# Patient Record
Sex: FEMALE | URBAN - METROPOLITAN AREA
[De-identification: names, ages, dates, MRNs, and addresses within clinical notes are randomized per-mention and may not be internally consistent; named-entity substitution may affect disease eponyms.]

---

## 2018-01-05 NOTE — PROGRESS NOTES
"  SUBJECTIVE:                                                    Ab Christianson is a 22 year old female who presents to clinic today for the following health issues:      HPI    Foot Pain    Onset: 1 week    Description:   Location: Left foot  Character: Patient states she feels like she has a ball in her foot.    Intensity: 8/10 at its worst    Progression of Symptoms: same, feels pain while walking.    Accompanying Signs & Symptoms:  Other symptoms: swelling    History:   Previous similar pain: no       Precipitating factors:   Trauma or overuse: Unsure     Alleviating factors:  Improved by: rest/inactivity, massage and Ibuprofen    Pain can move up into calf area.   No swelling or redness  No history of clots and no family history of bleeding disorders or clots  Does not smoke or have heart disease.     Tenderness along the plantar area.       Would like referral for gynecology to get Nexplanon    Heart Palpitations:   No caffeine  Catching her breath when this happens  Does not always happen  Not sure if it is anxiety related        Problem list and histories reviewed & adjusted, as indicated.  Additional history: as documented        No current outpatient prescriptions on file.     BP Readings from Last 3 Encounters:   01/08/18 110/58   10/01/15 94/62   09/21/15 100/60    Wt Readings from Last 3 Encounters:   01/08/18 146 lb (66.2 kg)   10/01/15 149 lb (67.6 kg) (82 %)*   09/21/15 151 lb 8 oz (68.7 kg) (84 %)*     * Growth percentiles are based on CDC 2-20 Years data.              ROS:  Intermittent shortness of breath with heart palpitations. Was on Propanolol years ago for migraines. No longer taking any medications.   Occasionally will take OTC meds for headaches. Nothing with caffeine.       OBJECTIVE:     /58  Pulse 76  Temp 97.6  F (36.4  C) (Temporal)  Resp 14  Ht 5' 2\" (1.575 m)  Wt 146 lb (66.2 kg)  SpO2 99%  Breastfeeding? No  BMI 26.7 kg/m2  Body mass index is 26.7 " kg/(m^2).  Physical Exam   Constitutional: She is oriented to person, place, and time. Vital signs are normal.   Eyes: Pupils are equal, round, and reactive to light.   Cardiovascular: Normal rate, regular rhythm and normal heart sounds.    Pulses:       Radial pulses are 2+ on the right side, and 2+ on the left side.   Pulmonary/Chest: Effort normal and breath sounds normal.   Musculoskeletal:        Left ankle: She exhibits normal range of motion, no swelling, no ecchymosis and normal pulse.        Left foot: There is tenderness. There is normal range of motion, no bony tenderness, no swelling and normal capillary refill.        Feet:    Tenderness along along the left foot, specifically the plantar surface near the arch.    Neurological: She is alert and oriented to person, place, and time.         Diagnostic Test Results:  EKG NS bradycardia HR 59 otherwise normal.     Results for orders placed or performed in visit on 01/08/18   CBC with platelets   Result Value Ref Range    WBC 8.2 4.0 - 11.0 10e9/L    RBC Count 4.71 3.8 - 5.2 10e12/L    Hemoglobin 13.9 11.7 - 15.7 g/dL    Hematocrit 42.1 35.0 - 47.0 %    MCV 89 78 - 100 fl    MCH 29.5 26.5 - 33.0 pg    MCHC 33.0 31.5 - 36.5 g/dL    RDW 12.3 10.0 - 15.0 %    Platelet Count 236 150 - 450 10e9/L   Comprehensive metabolic panel   Result Value Ref Range    Sodium 138 133 - 144 mmol/L    Potassium 3.8 3.4 - 5.3 mmol/L    Chloride 104 94 - 109 mmol/L    Carbon Dioxide 27 20 - 32 mmol/L    Anion Gap 7 3 - 14 mmol/L    Glucose 87 70 - 99 mg/dL    Urea Nitrogen 12 7 - 30 mg/dL    Creatinine 0.65 0.52 - 1.04 mg/dL    GFR Estimate >90 >60 mL/min/1.7m2    GFR Estimate If Black >90 >60 mL/min/1.7m2    Calcium 9.1 8.5 - 10.1 mg/dL    Bilirubin Total 0.5 0.2 - 1.3 mg/dL    Albumin 4.2 3.4 - 5.0 g/dL    Protein Total 7.3 6.8 - 8.8 g/dL    Alkaline Phosphatase 80 40 - 150 U/L    ALT 32 0 - 50 U/L    AST 9 0 - 45 U/L   TSH with free T4 reflex   Result Value Ref Range    TSH  1.82 0.40 - 4.00 mU/L   Neisseria gonorrhoeae PCR   Result Value Ref Range    Specimen Descrip Urine     N Gonorrhea PCR Negative NEG^Negative   Chlamydia trachomatis PCR   Result Value Ref Range    Specimen Description Urine     Chlamydia Trachomatis PCR Negative NEG^Negative       ASSESSMENT/PLAN:     1. Left foot pain  - Likely related to shoes while working and probably some baseline plantar fascitis   - Given pain within the calf area and patients concern for clot completed lower extremity ultrasound which was negative for DVT. This was low clinical suspicion as there was not calf tenderness, swelling, redness and negative Homans sign.   - Discussed plantar fascitis along with conservative treatment plans-  - Follow up with podiatry   - XR Foot Left G/E 3 Views; Future  - US Lower Extremity Venous Duplex Left; Future    2. Heart palpitations  - EKG normal  - Labs normal  - Recommend follow up visit and Holter monitor in the future. Patient can't follow up for 2 weeks due to travel and will make a physical appointment.   - EKG 12-lead complete w/read - Clinics  - CBC with platelets  - Comprehensive metabolic panel  - TSH with free T4 reflex    3. Encounter for female birth control  - Referral and STD testing today.   - Patient would like Nexplanon.   - OB/GYN REFERRAL  - Neisseria gonorrhoeae PCR    4. Screening examination for venereal disease    - Neisseria gonorrhoeae PCR  - Chlamydia trachomatis PCR    The patient indicates understanding of these issues and agrees with the plan.    Patient Instructions   - STD testing today at lab along with additional labs   - Follow up with gynecology to discuss Nexplanon implant.   - Xray of left foot and exercises as listed below. Recommend TYLENOL 1000 mg every 6 hours; maximum daily dose: 3000 mg daily alternating with ibuprofen 400 every 6 hours. Follow up with Podiatry as referred.   - Ultrasound of left calf today to r/o DVT  - Follow up with me in 2 weeks to  discuss heart palpitations and physical. EKG today.   -  If you develop shortness of breath, chest pain, worsening symptoms of your heart palpitations.     KRISTY Snow CNP, APRN CNP  [facility]

## 2018-01-08 ENCOUNTER — RADIANT APPOINTMENT (OUTPATIENT)
Dept: ULTRASOUND IMAGING | Facility: OTHER | Age: 23
End: 2018-01-08
Attending: NURSE PRACTITIONER
Payer: COMMERCIAL

## 2018-01-08 ENCOUNTER — TELEPHONE (OUTPATIENT)
Dept: FAMILY MEDICINE | Facility: OTHER | Age: 23
End: 2018-01-08

## 2018-01-08 ENCOUNTER — OFFICE VISIT (OUTPATIENT)
Dept: FAMILY MEDICINE | Facility: OTHER | Age: 23
End: 2018-01-08
Payer: COMMERCIAL

## 2018-01-08 ENCOUNTER — RADIANT APPOINTMENT (OUTPATIENT)
Dept: GENERAL RADIOLOGY | Facility: OTHER | Age: 23
End: 2018-01-08
Attending: NURSE PRACTITIONER
Payer: COMMERCIAL

## 2018-01-08 VITALS
TEMPERATURE: 97.6 F | HEART RATE: 76 BPM | RESPIRATION RATE: 14 BRPM | SYSTOLIC BLOOD PRESSURE: 110 MMHG | DIASTOLIC BLOOD PRESSURE: 58 MMHG | HEIGHT: 62 IN | WEIGHT: 146 LBS | BODY MASS INDEX: 26.87 KG/M2 | OXYGEN SATURATION: 99 %

## 2018-01-08 DIAGNOSIS — Z11.3 SCREENING EXAMINATION FOR VENEREAL DISEASE: ICD-10-CM

## 2018-01-08 DIAGNOSIS — R00.2 HEART PALPITATIONS: ICD-10-CM

## 2018-01-08 DIAGNOSIS — M79.672 LEFT FOOT PAIN: ICD-10-CM

## 2018-01-08 DIAGNOSIS — Z30.019 ENCOUNTER FOR FEMALE BIRTH CONTROL: ICD-10-CM

## 2018-01-08 DIAGNOSIS — M79.672 LEFT FOOT PAIN: Primary | ICD-10-CM

## 2018-01-08 LAB
ALBUMIN SERPL-MCNC: 4.2 G/DL (ref 3.4–5)
ALP SERPL-CCNC: 80 U/L (ref 40–150)
ALT SERPL W P-5'-P-CCNC: 32 U/L (ref 0–50)
ANION GAP SERPL CALCULATED.3IONS-SCNC: 7 MMOL/L (ref 3–14)
AST SERPL W P-5'-P-CCNC: 9 U/L (ref 0–45)
BILIRUB SERPL-MCNC: 0.5 MG/DL (ref 0.2–1.3)
BUN SERPL-MCNC: 12 MG/DL (ref 7–30)
CALCIUM SERPL-MCNC: 9.1 MG/DL (ref 8.5–10.1)
CHLORIDE SERPL-SCNC: 104 MMOL/L (ref 94–109)
CO2 SERPL-SCNC: 27 MMOL/L (ref 20–32)
CREAT SERPL-MCNC: 0.65 MG/DL (ref 0.52–1.04)
ERYTHROCYTE [DISTWIDTH] IN BLOOD BY AUTOMATED COUNT: 12.3 % (ref 10–15)
GFR SERPL CREATININE-BSD FRML MDRD: >90 ML/MIN/1.7M2
GLUCOSE SERPL-MCNC: 87 MG/DL (ref 70–99)
HCT VFR BLD AUTO: 42.1 % (ref 35–47)
HGB BLD-MCNC: 13.9 G/DL (ref 11.7–15.7)
MCH RBC QN AUTO: 29.5 PG (ref 26.5–33)
MCHC RBC AUTO-ENTMCNC: 33 G/DL (ref 31.5–36.5)
MCV RBC AUTO: 89 FL (ref 78–100)
PLATELET # BLD AUTO: 236 10E9/L (ref 150–450)
POTASSIUM SERPL-SCNC: 3.8 MMOL/L (ref 3.4–5.3)
PROT SERPL-MCNC: 7.3 G/DL (ref 6.8–8.8)
RBC # BLD AUTO: 4.71 10E12/L (ref 3.8–5.2)
SODIUM SERPL-SCNC: 138 MMOL/L (ref 133–144)
TSH SERPL DL<=0.005 MIU/L-ACNC: 1.82 MU/L (ref 0.4–4)
WBC # BLD AUTO: 8.2 10E9/L (ref 4–11)

## 2018-01-08 PROCEDURE — 73630 X-RAY EXAM OF FOOT: CPT | Mod: LT

## 2018-01-08 PROCEDURE — 80053 COMPREHEN METABOLIC PANEL: CPT | Performed by: NURSE PRACTITIONER

## 2018-01-08 PROCEDURE — 36415 COLL VENOUS BLD VENIPUNCTURE: CPT | Performed by: NURSE PRACTITIONER

## 2018-01-08 PROCEDURE — 93000 ELECTROCARDIOGRAM COMPLETE: CPT | Performed by: NURSE PRACTITIONER

## 2018-01-08 PROCEDURE — 84443 ASSAY THYROID STIM HORMONE: CPT | Performed by: NURSE PRACTITIONER

## 2018-01-08 PROCEDURE — 85027 COMPLETE CBC AUTOMATED: CPT | Performed by: NURSE PRACTITIONER

## 2018-01-08 PROCEDURE — 87491 CHLMYD TRACH DNA AMP PROBE: CPT | Performed by: NURSE PRACTITIONER

## 2018-01-08 PROCEDURE — 99214 OFFICE O/P EST MOD 30 MIN: CPT | Performed by: NURSE PRACTITIONER

## 2018-01-08 PROCEDURE — 87591 N.GONORRHOEAE DNA AMP PROB: CPT | Performed by: NURSE PRACTITIONER

## 2018-01-08 PROCEDURE — 93971 EXTREMITY STUDY: CPT | Mod: LT

## 2018-01-08 ASSESSMENT — PAIN SCALES - GENERAL: PAINLEVEL: MILD PAIN (2)

## 2018-01-08 NOTE — TELEPHONE ENCOUNTER
Patient seen today for foot pain. She is asking for a letter for work excusing her from work from dates 1/05/18-1/08/18 because of her foot pain. She would like to pick letter up from clinic this afternoon.  Jennifer Pereira CMA

## 2018-01-08 NOTE — LETTER
[facility]  [address]   [place] [facility] [place] [facility] [address]  Phone: [phone]    January 8, 2018        Ab Christianson  [address]  [id number]  [place] [address]          To whom it may concern:    RE: Ab Christianson    Patient was seen and treated today at our clinic and missed work. Please excuse her from work 01/05/2018- 01/08/2018    Please contact me for questions or concerns.      Sincerely,        KRISTY Snow CNP

## 2018-01-08 NOTE — LETTER
[facility]  [address]   [place] [address]  Phone: [phone]    January 8, 2018        Ab Christianson  [address]  [id number]  [place] [facility] [address]          To whom it may concern:    RE: Ab Christianson    Patient was seen and treated today at our clinic.    Please contact me for questions or concerns.      Sincerely,        KRISTY Snow CNP/sb

## 2018-01-08 NOTE — NURSING NOTE
"Chief Complaint   Patient presents with     Musculoskeletal Problem     Panel Management     height, amberly, mtchart, hpv, flu, chlamydia       Initial /58  Pulse 76  Temp 97.6  F (36.4  C) (Temporal)  Resp 14  Ht 5' 2\" (1.575 m)  Wt 146 lb (66.2 kg)  SpO2 99%  Breastfeeding? No  BMI 26.7 kg/m2 Estimated body mass index is 26.7 kg/(m^2) as calculated from the following:    Height as of this encounter: 5' 2\" (1.575 m).    Weight as of this encounter: 146 lb (66.2 kg).  Medication Reconciliation: complete  "

## 2018-01-08 NOTE — MR AVS SNAPSHOT
After Visit Summary   1/8/2018    Ab Christianson    MRN: [id number]           Patient Information     Date Of Birth          1995        Visit Information        Provider Department      1/8/2018 9:30 AM Fide Castaneda APRN CNP [facility]        Today's Diagnoses     Left foot pain    -  1    Heart palpitations        Encounter for female birth control        Screening examination for venereal disease          Care Instructions    - STD testing today at lab along with additional labs   - Follow up with gynecology to discuss Nexplanon implant.   - Xray of left foot and exercises as listed below. Recommend TYLENOL 1000 mg every 6 hours; maximum daily dose: 3000 mg daily alternating with ibuprofen 400 every 6 hours. Follow up with Podiatry as referred.   - Ultrasound of left calf today to r/o DVT  - Follow up with me in 2 weeks to discuss heart palpitations and physical. EKG today.   -  If you develop shortness of breath, chest pain, worsening symptoms of your heart palpitations.     KRISTY Snow CNP            Follow-ups after your visit        Additional Services     OB/GYN REFERRAL       Your provider has referred you to:  [facility]: [facility] [phone]   [email or web address]    Please be aware that coverage of these services is subject to the terms and limitations of your health insurance plan.  Call member services at your health plan with any benefit or coverage questions.      Please bring the following with you to your appointment:    (1) Any X-Rays, CTs or MRIs which have been performed.  Contact the facility where they were done to arrange for  prior to your scheduled appointment.   (2) List of current medications   (3) This referral request   (4) Any documents/labs given to you for this referral                  Follow-up notes from your care team     Return in about 2 weeks (around  "1/22/2018) for Physical Exam.      Your next 10 appointments already scheduled     Jan 08, 2018 10:50 AM CST   US LOWER EXTREMITY VENOUS DUPLEX LEFT with ERUS1   [facility]    [address] [id number] [phone]           Please bring a list of your medicines (including vitamins, minerals and over-the-counter drugs). Also, tell your doctor about any allergies you may have. Wear comfortable clothes and leave your valuables at home.  You do not need to do anything special to prepare for your exam.  Please call the Imaging Department at your exam site with any questions.              Future tests that were ordered for you today     Open Future Orders        Priority Expected Expires Ordered    US Lower Extremity Venous Duplex Left Routine  1/8/2019 1/8/2018    XR Foot Left G/E 3 Views Routine 1/8/2018 1/8/2019 1/8/2018            Who to contact     If you have questions or need follow up information about today's clinic visit or your schedule please contact [facility] directly at [phone].  Normal or non-critical lab and imaging results will be communicated to you by MyChart, letter or phone within 4 business days after the clinic has received the results. If you do not hear from us within 7 days, please contact the clinic through [email or web address] or phone. If you have a critical or abnormal lab result, we will notify you by phone as soon as possible.  Submit refill requests through [employer] or call your pharmacy and they will forward the refill request to us. Please allow 3 business days for your refill to be completed.          Additional Information About Your Visit        [email or web address] Information     [employer] lets you send messages to your doctor, view your test results, renew your prescriptions, schedule appointments and more. To sign up, go to [email or web address] . Click on \"Log in\" on the left side of the screen, which will take you to " "the Welcome page. Then click on \"Sign up Now\" on the right side of the page.     You will be asked to enter the access code listed below, as well as some personal information. Please follow the directions to create your username and password.     Your access code is: [email or web address]  Expires: 2018 10:44 AM     Your access code will  in 90 days. If you need help or a new code, please call your [place] clinic or [phone].        Care EveryWhere ID     This is your Care EveryWhere ID. This could be used by other organizations to access your [place] medical records  [id number]        Your Vitals Were     Pulse Temperature Respirations Height Pulse Oximetry Breastfeeding?    76 97.6  F (36.4  C) (Temporal) 14 5' 2\" (1.575 m) 99% No    BMI (Body Mass Index)                   26.7 kg/m2            Blood Pressure from Last 3 Encounters:   18 110/58   10/01/15 94/62   09/21/15 100/60    Weight from Last 3 Encounters:   18 146 lb (66.2 kg)   10/01/15 149 lb (67.6 kg) (82 %)*   09/21/15 151 lb 8 oz (68.7 kg) (84 %)*     * Growth percentiles are based on CDC 2-20 Years data.              We Performed the Following     CBC with platelets     Chlamydia trachomatis PCR     Comprehensive metabolic panel     EKG 12-lead complete w/read - Clinics     Neisseria gonorrhoeae PCR     OB/GYN REFERRAL     TSH with free T4 reflex        Primary Care Provider Office Phone # Fax #    KRISTY Ghosh [phone]       [facility] [address]  [place] [address]        Equal Access to Services     [facility] ELKIN : Hadalejo Crawford, deana fink, amalia burton, kelly adorno. So [facility] [phone].    ATENCIÓN: Si habla español, tiene a jama disposición servicios gratuitos de asistencia lingüística. Llame al [phone].    We comply with applicable federal civil rights laws and [place] laws. We do not discriminate " on the basis of race, color, national origin, age, disability, sex, sexual orientation, or gender identity.            Thank you!     Thank you for choosing [facility]  for your care. Our goal is always to provide you with excellent care. Hearing back from our patients is one way we can continue to improve our services. Please take a few minutes to complete the written survey that you may receive in the mail after your visit with us. Thank you!             Your Updated Medication List - Protect others around you: Learn how to safely use, store and throw away your medicines at www.disposemymeds.org.      Notice  As of 1/8/2018 10:44 AM    You have not been prescribed any medications.

## 2018-01-08 NOTE — PATIENT INSTRUCTIONS
- STD testing today at lab along with additional labs   - Follow up with gynecology to discuss Nexplanon implant.   - Xray of left foot and exercises as listed below. Recommend TYLENOL 1000 mg every 6 hours; maximum daily dose: 3000 mg daily alternating with ibuprofen 400 every 6 hours. Follow up with Podiatry as referred.   - Ultrasound of left calf today to r/o DVT  - Follow up with me in 2 weeks to discuss heart palpitations and physical. EKG today.   -  If you develop shortness of breath, chest pain, worsening symptoms of your heart palpitations.     KRISTY Snow CNP

## 2018-01-08 NOTE — TELEPHONE ENCOUNTER
Please let patient know that her ultrasound was negative for clot. Recommend treatment for plantar fascitis.     1. Choose good supportive shoes with good arch support and shock absorbency.   2. Consider orthotics, podiatry referral placed please schedule  3. Recommend stretching your feet against the wall first thing in the morning, before working out and at night.   4. Icing to help as preventative and during flares.   5. Ibuprofen as needed for pain   6. Will let you know your xray results.     KRISTY Snow CNP

## 2018-01-09 LAB
C TRACH DNA SPEC QL NAA+PROBE: NEGATIVE
N GONORRHOEA DNA SPEC QL NAA+PROBE: NEGATIVE
SPECIMEN SOURCE: NORMAL
SPECIMEN SOURCE: NORMAL

## 2018-03-05 NOTE — PROGRESS NOTES
SUBJECTIVE:   CC: Ab Christianson is an 23 year old woman who presents for preventive health visit.     Healthy Habits:    Do you get at least three servings of calcium containing foods daily (dairy, green leafy vegetables, etc.)? yes    Amount of exercise or daily activities, outside of work: 2-3 day(s) per week    Problems taking medications regularly No    Medication side effects: No    Have you had an eye exam in the past two years? yes    Do you see a dentist twice per year? Once a year    Do you have sleep apnea, excessive snoring or daytime drowsiness?no      No concerns  Stomach feels like there is a ball in there for two weeks.  No abdominal pain.   LMP January 25.  No period since.  She is overdue.  She usually has monthly periods.      She was told she tested positive for hsv in [facility].  She has never had any lesions.  Her partner at the time was negative.     No birth control     Today's PHQ-2 Score:   PHQ-2 ( 1999 Pf[employer]) 3/8/2018 9/21/2015   Q1: Little interest or pleasure in doing things 0 1   Q2: Feeling down, depressed or hopeless 0 1   PHQ-2 Score 0 2       Abuse: Current or Past(Physical, Sexual or Emotional)- No  Do you feel safe in your environment - Yes    Social History   Substance Use Topics     Smoking status: Never Smoker     Smokeless tobacco: Never Used     Alcohol use No     If you drink alcohol do you typically have >3 drinks per day or >7 drinks per week? No                     BP Readings from Last 3 Encounters:   03/08/18 110/60   03/06/18 112/78   01/08/18 110/58    Wt Readings from Last 3 Encounters:   03/08/18 150 lb 4 oz (68.2 kg)   03/06/18 151 lb (68.5 kg)   01/08/18 146 lb (66.2 kg)                  Patient Active Problem List   Diagnosis     Obesity     DUB (dysfunctional uterine bleeding)     Nasal congestion     Constipation     IgA deficiency (H)     Failed vision screen     History of migraine     Past Surgical History:   Procedure Laterality Date      TONSILLECTOMY & ADENOIDECTOMY  2000       Social History   Substance Use Topics     Smoking status: Never Smoker     Smokeless tobacco: Never Used     Alcohol use No     Family History   Problem Relation Age of Onset     DIABETES Maternal Grandmother      Breast Cancer Maternal Grandmother      Prostate Cancer Maternal Grandfather 67      at age 67     Asthma No family hx of          No current outpatient prescriptions on file.     Allergies   Allergen Reactions     Caffeine Palpitations     Recent Labs   Lab Test  18   1619  18   1054  04/23/15   0838   12   0843  12   1449   A1C   --    --    --    --    --   5.3   LDL   --    --    --    --   102   --    HDL   --    --   47*   --   46*   --    TRIG   --    --    --    --   118   --    ALT  28  32  21   --    --   17   CR  0.48*  0.65  0.73   < >   --    --    GFRESTIMATED  >90  >90  >90  Non African American GFR Calc     < >   --    --    GFRESTBLACK  >90  >90  >90  African American GFR Calc     < >   --    --    POTASSIUM  3.9  3.8  4.0   < >   --    --    TSH  1.82  1.82  1.07   --    --   0.58    < > = values in this interval not displayed.        Mammogram not appropriate for this patient based on age.    ROS:  C: NEGATIVE for fever, chills, change in weight  I: NEGATIVE for worrisome rashes, moles or lesions  E: NEGATIVE for vision changes or irritation  ENT: NEGATIVE for ear, mouth and throat problems  R: NEGATIVE for significant cough or SOB  B: NEGATIVE for masses, tenderness or discharge  CV: NEGATIVE for chest pain, palpitations or peripheral edema  GI: NEGATIVE for nausea, abdominal pain, heartburn, or change in bowel habits  : NEGATIVE for unusual urinary or vaginal symptoms. See above  M: NEGATIVE for significant arthralgias or myalgia  N: NEGATIVE for weakness, dizziness or paresthesias  P: NEGATIVE for changes in mood or affect    OBJECTIVE:   /60 (BP Location: Right arm, Patient Position: Chair, Cuff Size: Adult  "Regular)  Pulse 64  Ht 5' 1.02\" (1.55 m)  Wt 150 lb 4 oz (68.2 kg)  LMP 01/25/2018 (Exact Date)  BMI 28.37 kg/m2  EXAM:  Gen: Alert and oriented times 3, no acute distress.  Well developed, well nourished, pleasant.    Neck: Supple, no masses.  No thyromegaly.  Breast: Symmetrical without lesions.  No dimpling, nipple discharge, or discrete masses.  No lymphadenopathy.  Chest:  Non labored.  Clear to auscultation bilaterally.    Heart: Regular, normal S1, S2.  No murmurs.   Abdomen: Soft, nontender, nondistended.  No hepatosplenomegaly.    :  Normal female external genitalia.  No lesions.  Urethral meatus normal.  Speculum exam reveals a normal vaginal vault, normal cervix.  No abnormal discharge.  Bimanual exam reveals a normal, mobile, nontender uterus.  No cervical motion tenderness.  Adnexa nontender with no palpable masses.    Extremities:  Nontender, no edema.    Pap obtained:  Yes     ASSESSMENT/PLAN:       ICD-10-CM    1. Well female exam with routine gynecological exam Z01.419    2. Encounter for screening for cervical cancer  Z12.4 PAP imaged thin layer screen only - recommended age 21 - 24 years   3. Screening for venereal disease Z11.3 Chlamydia trachomatis PCR     Neisseria gonorrhoeae PCR     Anti Treponema     Hepatitis B surface antigen     Hepatitis C antibody     Herpes Simplex Virus 1 and 2 IgG     HIV Antigen Antibody Combo     HSV IgM antibody   4. Irregular periods N92.6 HCG quantitative pregnancy       I will contact her with the results.  She will let me know if she does not get her period in the next couple of weeks.  She will observe the abdominal discomfort for now.  She will see family medicine if her symptoms worsen or do not improve.      She is thinking about the nexplanon    COUNSELING:   Reviewed preventive health counseling, as reflected in patient instructions         reports that she has never smoked. She has never used smokeless tobacco.    Estimated body mass index is " "28.37 kg/(m^2) as calculated from the following:    Height as of this encounter: 5' 1.02\" (1.55 m).    Weight as of this encounter: 150 lb 4 oz (68.2 kg).   Weight management plan: diet and exercise    Counseling Resources:  ATP IV Guidelines  Pooled Cohorts Equation Calculator  Breast Cancer Risk Calculator  FRAX Risk Assessment  ICSI Preventive Guidelines  Dietary Guidelines for Americans, 2010  USDA's MyPlate  ASA Prophylaxis  Lung CA Screening    Kristie Sarmiento MD  [facility] [place]"

## 2018-03-06 ENCOUNTER — OFFICE VISIT (OUTPATIENT)
Dept: FAMILY MEDICINE | Facility: OTHER | Age: 23
End: 2018-03-06
Payer: COMMERCIAL

## 2018-03-06 VITALS
BODY MASS INDEX: 27.79 KG/M2 | WEIGHT: 151 LBS | TEMPERATURE: 98 F | SYSTOLIC BLOOD PRESSURE: 112 MMHG | HEART RATE: 76 BPM | DIASTOLIC BLOOD PRESSURE: 78 MMHG | OXYGEN SATURATION: 100 % | RESPIRATION RATE: 16 BRPM | HEIGHT: 62 IN

## 2018-03-06 DIAGNOSIS — N91.2 AMENORRHEA: Primary | ICD-10-CM

## 2018-03-06 DIAGNOSIS — R14.1 FLATULENCE, ERUCTATION, AND GAS PAIN: ICD-10-CM

## 2018-03-06 DIAGNOSIS — R14.2 FLATULENCE, ERUCTATION, AND GAS PAIN: ICD-10-CM

## 2018-03-06 DIAGNOSIS — Z12.4 SCREENING FOR MALIGNANT NEOPLASM OF CERVIX: ICD-10-CM

## 2018-03-06 DIAGNOSIS — R14.3 FLATULENCE, ERUCTATION, AND GAS PAIN: ICD-10-CM

## 2018-03-06 LAB
ALBUMIN SERPL-MCNC: 4.7 G/DL (ref 3.4–5)
ALBUMIN UR-MCNC: NEGATIVE MG/DL
ALP SERPL-CCNC: 90 U/L (ref 40–150)
ALT SERPL W P-5'-P-CCNC: 28 U/L (ref 0–50)
ANION GAP SERPL CALCULATED.3IONS-SCNC: 9 MMOL/L (ref 3–14)
APPEARANCE UR: CLEAR
AST SERPL W P-5'-P-CCNC: 10 U/L (ref 0–45)
BASOPHILS # BLD AUTO: 0 10E9/L (ref 0–0.2)
BASOPHILS NFR BLD AUTO: 0.1 %
BETA HCG QUAL IFA URINE: NEGATIVE
BILIRUB SERPL-MCNC: 0.3 MG/DL (ref 0.2–1.3)
BILIRUB UR QL STRIP: NEGATIVE
BUN SERPL-MCNC: 11 MG/DL (ref 7–30)
CALCIUM SERPL-MCNC: 9 MG/DL (ref 8.5–10.1)
CHLORIDE SERPL-SCNC: 105 MMOL/L (ref 94–109)
CO2 SERPL-SCNC: 27 MMOL/L (ref 20–32)
COLOR UR AUTO: YELLOW
CREAT SERPL-MCNC: 0.48 MG/DL (ref 0.52–1.04)
DIFFERENTIAL METHOD BLD: NORMAL
EOSINOPHIL # BLD AUTO: 0 10E9/L (ref 0–0.7)
EOSINOPHIL NFR BLD AUTO: 0.5 %
ERYTHROCYTE [DISTWIDTH] IN BLOOD BY AUTOMATED COUNT: 12.4 % (ref 10–15)
GFR SERPL CREATININE-BSD FRML MDRD: >90 ML/MIN/1.7M2
GLUCOSE SERPL-MCNC: 89 MG/DL (ref 70–99)
GLUCOSE UR STRIP-MCNC: NEGATIVE MG/DL
HCT VFR BLD AUTO: 41.6 % (ref 35–47)
HGB BLD-MCNC: 14.1 G/DL (ref 11.7–15.7)
HGB UR QL STRIP: ABNORMAL
KETONES UR STRIP-MCNC: NEGATIVE MG/DL
LEUKOCYTE ESTERASE UR QL STRIP: NEGATIVE
LYMPHOCYTES # BLD AUTO: 2.7 10E9/L (ref 0.8–5.3)
LYMPHOCYTES NFR BLD AUTO: 35.8 %
MCH RBC QN AUTO: 29.6 PG (ref 26.5–33)
MCHC RBC AUTO-ENTMCNC: 33.9 G/DL (ref 31.5–36.5)
MCV RBC AUTO: 87 FL (ref 78–100)
MONOCYTES # BLD AUTO: 0.4 10E9/L (ref 0–1.3)
MONOCYTES NFR BLD AUTO: 5.5 %
NEUTROPHILS # BLD AUTO: 4.3 10E9/L (ref 1.6–8.3)
NEUTROPHILS NFR BLD AUTO: 58.1 %
NITRATE UR QL: NEGATIVE
PH UR STRIP: 5.5 PH (ref 5–7)
PLATELET # BLD AUTO: 226 10E9/L (ref 150–450)
POTASSIUM SERPL-SCNC: 3.9 MMOL/L (ref 3.4–5.3)
PROT SERPL-MCNC: 7.9 G/DL (ref 6.8–8.8)
RBC # BLD AUTO: 4.76 10E12/L (ref 3.8–5.2)
RBC #/AREA URNS AUTO: NORMAL /HPF
SODIUM SERPL-SCNC: 141 MMOL/L (ref 133–144)
SOURCE: ABNORMAL
SP GR UR STRIP: 1.01 (ref 1–1.03)
TSH SERPL DL<=0.005 MIU/L-ACNC: 1.82 MU/L (ref 0.4–4)
UROBILINOGEN UR STRIP-ACNC: 0.2 EU/DL (ref 0.2–1)
WBC # BLD AUTO: 7.4 10E9/L (ref 4–11)
WBC #/AREA URNS AUTO: NORMAL /HPF

## 2018-03-06 PROCEDURE — 99214 OFFICE O/P EST MOD 30 MIN: CPT | Performed by: FAMILY MEDICINE

## 2018-03-06 PROCEDURE — 36415 COLL VENOUS BLD VENIPUNCTURE: CPT | Performed by: FAMILY MEDICINE

## 2018-03-06 PROCEDURE — 80053 COMPREHEN METABOLIC PANEL: CPT | Performed by: FAMILY MEDICINE

## 2018-03-06 PROCEDURE — 85025 COMPLETE CBC W/AUTO DIFF WBC: CPT | Performed by: FAMILY MEDICINE

## 2018-03-06 PROCEDURE — 84703 CHORIONIC GONADOTROPIN ASSAY: CPT | Performed by: FAMILY MEDICINE

## 2018-03-06 PROCEDURE — 84443 ASSAY THYROID STIM HORMONE: CPT | Performed by: FAMILY MEDICINE

## 2018-03-06 PROCEDURE — 81001 URINALYSIS AUTO W/SCOPE: CPT | Performed by: FAMILY MEDICINE

## 2018-03-06 ASSESSMENT — PAIN SCALES - GENERAL: PAINLEVEL: NO PAIN (0)

## 2018-03-06 NOTE — MR AVS SNAPSHOT
After Visit Summary   3/6/2018    Ab Christianson    MRN: [id number]           Patient Information     Date Of Birth          1995        Visit Information        Provider Department      3/6/2018 3:00 PM Caitie Ann MD [facility]        Today's Diagnoses     Screening for malignant neoplasm of cervix    -  1    Amenorrhea        Flatulence, eructation, and gas pain           Follow-ups after your visit        Your next 10 appointments already scheduled     Mar 08, 2018  3:00 PM CST   PHYSICAL with Kristie Sarmiento MD   [facility] ([facility])    [address] [facility] [address] [phone]              Who to contact     If you have questions or need follow up information about today's clinic visit or your schedule please contact [facility] directly at [phone].  Normal or non-critical lab and imaging results will be communicated to you by MyChart, letter or phone within 4 business days after the clinic has received the results. If you do not hear from us within 7 days, please contact the clinic through MyChart or phone. If you have a critical or abnormal lab result, we will notify you by phone as soon as possible.  Submit refill requests through XING or call your pharmacy and they will forward the refill request to us. Please allow 3 business days for your refill to be completed.          Additional Information About Your Visit        MyChart Information     XING gives you secure access to your electronic health record. If you see a primary care provider, you can also send messages to your care team and make appointments. If you have questions, please call your primary care clinic.  If you do not have a primary care provider, please call [phone] and they will assist you.        Care EveryWhere ID     This is your Care EveryWhere ID. This could be used by other organizations to access  "your [facility] medical records  [id number]        Your Vitals Were     Pulse Temperature Respirations Height Last Period Pulse Oximetry    76 98  F (36.7  C) (Oral) 16 5' 2\" (1.575 m) 01/25/2018 100%    BMI (Body Mass Index)                   27.62 kg/m2            Blood Pressure from Last 3 Encounters:   03/06/18 112/78   01/08/18 110/58   10/01/15 94/62    Weight from Last 3 Encounters:   03/06/18 151 lb (68.5 kg)   01/08/18 146 lb (66.2 kg)   10/01/15 149 lb (67.6 kg) (82 %)*     * Growth percentiles are based on CDC 2-20 Years data.              We Performed the Following     *UA reflex to Microscopic     Beta HCG Qual, Urine - [facility] ([id number])     CBC with platelets and differential     Comprehensive metabolic panel (BMP + Alb, Alk Phos, ALT, AST, Total. Bili, TP)     TSH with free T4 reflex        Primary Care Provider Office Phone # Fax #    KRISTY Ghosh [facility] [phone]       [facility] [address]  [facility] [address]        Equal Access to Services     NELLY GRANGER AH: Hadii zaida hernandez Sosylvia, waaxda luqadaha, qaybta kaalmada adedaliyada, kelly adorno. So [facility] [phone].    ATENCIÓN: Si habla español, tiene a jama disposición servicios gratuitos de asistencia lingüística. Mei al [phone].    We comply with applicable federal civil rights laws and [place] laws. We do not discriminate on the basis of race, color, national origin, age, disability, sex, sexual orientation, or gender identity.            Thank you!     Thank you for choosing [facility]  for your care. Our goal is always to provide you with excellent care. Hearing back from our patients is one way we can continue to improve our services. Please take a few minutes to complete the written survey that you may receive in the mail after your visit with us. Thank you!             Your Updated Medication List - Protect others around " you: Learn how to safely use, store and throw away your medicines at www.disposemymeds.org.      Notice  As of 3/6/2018  4:19 PM    You have not been prescribed any medications.

## 2018-03-06 NOTE — NURSING NOTE
"Chief Complaint   Patient presents with     GI Problem     Panel Management       Initial /78 (BP Location: Right arm, Patient Position: Chair, Cuff Size: Adult Regular)  Pulse 76  Temp 98  F (36.7  C) (Oral)  Resp 16  Ht 5' 2\" (1.575 m)  Wt 151 lb (68.5 kg)  LMP 01/25/2018  SpO2 100%  BMI 27.62 kg/m2 Estimated body mass index is 27.62 kg/(m^2) as calculated from the following:    Height as of this encounter: 5' 2\" (1.575 m).    Weight as of this encounter: 151 lb (68.5 kg).  Medication Reconciliation: complete   Emilia Treviño CMA (AAMA)      "

## 2018-03-06 NOTE — PROGRESS NOTES
SUBJECTIVE:   Ab Christianson is a 23 year old female who presents to clinic today for the following health issues:      HPI  Concern - Bloating  Onset: x 11 days    Description:   Stomach feels bloated    Intensity: mild, 0/10    Progression of Symptoms:  same    Accompanying Signs & Symptoms:  (stomach feels like big ball)    Previous history of similar problem:   None    Precipitating factors:   Worsened by: None    Alleviating factors:  Improved by: None    Therapies Tried and outcome: None      Problem list and histories reviewed & adjusted, as indicated.  Additional history: as documented        Patient Active Problem List   Diagnosis     Obesity     DUB (dysfunctional uterine bleeding)     Nasal congestion     Constipation     IgA deficiency (H)     Failed vision screen     History of migraine     Flatulence, eructation, and gas pain     Past Surgical History:   Procedure Laterality Date     TONSILLECTOMY & ADENOIDECTOMY         Social History   Substance Use Topics     Smoking status: Never Smoker     Smokeless tobacco: Never Used     Alcohol use No     Family History   Problem Relation Age of Onset     DIABETES Maternal Grandmother      Breast Cancer Maternal Grandmother      Prostate Cancer Maternal Grandfather 67      at age 67     Asthma No family hx of          No current outpatient prescriptions on file.     Allergies   Allergen Reactions     Caffeine Palpitations     BP Readings from Last 3 Encounters:   18 110/60   18 112/78   18 110/58    Wt Readings from Last 3 Encounters:   18 150 lb 4 oz (68.2 kg)   18 151 lb (68.5 kg)   18 146 lb (66.2 kg)                  Labs reviewed in EPIC    ROS:  Constitutional, HEENT, cardiovascular, pulmonary, GI, , musculoskeletal, neuro, skin, endocrine and psych systems are negative, except as otherwise noted.    OBJECTIVE:     /78 (BP Location: Right arm, Patient Position: Chair, Cuff Size: Adult  "Regular)  Pulse 76  Temp 98  F (36.7  C) (Oral)  Resp 16  Ht 5' 2\" (1.575 m)  Wt 151 lb (68.5 kg)  LMP 01/25/2018  SpO2 100%  BMI 27.62 kg/m2  Body mass index is 27.62 kg/(m^2).   Physical Exam   Constitutional: She is oriented to person, place, and time. She appears well-developed and well-nourished.   HENT:   Head: Normocephalic and atraumatic.   Cardiovascular: Normal rate, regular rhythm, normal heart sounds and intact distal pulses.  Exam reveals no gallop.    No murmur heard.  Pulmonary/Chest: Effort normal and breath sounds normal.   Abdominal: Soft. Bowel sounds are normal. She exhibits distension. She exhibits no mass. There is no tenderness. There is no rebound and no guarding.   Neurological: She is alert and oriented to person, place, and time.   Psychiatric: She has a normal mood and affect.         Diagnostic Test Results:  none     ASSESSMENT/PLAN:     Problem List Items Addressed This Visit     Flatulence, eructation, and gas pain     Pregnancy versus constipation versus metabolic causes versus ovarian pathology  Pregnancy test is negative.  Labs as ordered to rule out metabolic causes like hypothyroidism   Follow results.  If the tests are inconclusive and if she persists to have symptoms I would consider repeating pregnancy test in the next week and also getting an ultrasound of the pelvis to rule out ovarian pathology.           Relevant Orders    TSH with free T4 reflex (Completed)    CBC with platelets and differential (Completed)    Comprehensive metabolic panel (BMP + Alb, Alk Phos, ALT, AST, Total. Bili, TP) (Completed)    *UA reflex to Microscopic (Completed)      Other Visit Diagnoses     Amenorrhea    -  Primary    Relevant Orders    Beta HCG Qual, Urine - [facility] ([id number]) (Completed)    TSH with free T4 reflex (Completed)    CBC with platelets and differential (Completed)    Comprehensive metabolic panel (BMP + Alb, Alk Phos, ALT, AST, Total. Bili, TP) (Completed)    " *UA reflex to Microscopic (Completed)    Screening for malignant neoplasm of cervix        Relevant Orders    Urine Microscopic (Completed)         Caitie Ann MD  [facility] [place]

## 2018-03-08 ENCOUNTER — OFFICE VISIT (OUTPATIENT)
Dept: OBGYN | Facility: OTHER | Age: 23
End: 2018-03-08
Payer: COMMERCIAL

## 2018-03-08 VITALS
HEIGHT: 61 IN | HEART RATE: 64 BPM | DIASTOLIC BLOOD PRESSURE: 60 MMHG | WEIGHT: 150.25 LBS | SYSTOLIC BLOOD PRESSURE: 110 MMHG | BODY MASS INDEX: 28.37 KG/M2

## 2018-03-08 DIAGNOSIS — Z01.419 WELL FEMALE EXAM WITH ROUTINE GYNECOLOGICAL EXAM: Primary | ICD-10-CM

## 2018-03-08 DIAGNOSIS — N92.6 IRREGULAR PERIODS: ICD-10-CM

## 2018-03-08 DIAGNOSIS — Z11.3 SCREENING FOR VENEREAL DISEASE: ICD-10-CM

## 2018-03-08 DIAGNOSIS — Z12.4 ENCOUNTER FOR SCREENING FOR CERVICAL CANCER: ICD-10-CM

## 2018-03-08 PROBLEM — R14.1 FLATULENCE, ERUCTATION, AND GAS PAIN: Status: ACTIVE | Noted: 2018-03-08

## 2018-03-08 PROBLEM — R14.2 FLATULENCE, ERUCTATION, AND GAS PAIN: Status: ACTIVE | Noted: 2018-03-08

## 2018-03-08 PROBLEM — R14.3 FLATULENCE, ERUCTATION, AND GAS PAIN: Status: ACTIVE | Noted: 2018-03-08

## 2018-03-08 LAB — B-HCG SERPL-ACNC: <1 IU/L (ref 0–5)

## 2018-03-08 PROCEDURE — 87340 HEPATITIS B SURFACE AG IA: CPT | Performed by: OBSTETRICS & GYNECOLOGY

## 2018-03-08 PROCEDURE — 87591 N.GONORRHOEAE DNA AMP PROB: CPT | Performed by: OBSTETRICS & GYNECOLOGY

## 2018-03-08 PROCEDURE — 99385 PREV VISIT NEW AGE 18-39: CPT | Performed by: OBSTETRICS & GYNECOLOGY

## 2018-03-08 PROCEDURE — 86694 HERPES SIMPLEX NES ANTBDY: CPT | Performed by: OBSTETRICS & GYNECOLOGY

## 2018-03-08 PROCEDURE — 87389 HIV-1 AG W/HIV-1&-2 AB AG IA: CPT | Performed by: OBSTETRICS & GYNECOLOGY

## 2018-03-08 PROCEDURE — 86803 HEPATITIS C AB TEST: CPT | Performed by: OBSTETRICS & GYNECOLOGY

## 2018-03-08 PROCEDURE — 86695 HERPES SIMPLEX TYPE 1 TEST: CPT | Performed by: OBSTETRICS & GYNECOLOGY

## 2018-03-08 PROCEDURE — G0145 SCR C/V CYTO,THINLAYER,RESCR: HCPCS | Performed by: OBSTETRICS & GYNECOLOGY

## 2018-03-08 PROCEDURE — G0124 SCREEN C/V THIN LAYER BY MD: HCPCS | Performed by: OBSTETRICS & GYNECOLOGY

## 2018-03-08 PROCEDURE — 84702 CHORIONIC GONADOTROPIN TEST: CPT | Performed by: OBSTETRICS & GYNECOLOGY

## 2018-03-08 PROCEDURE — 36415 COLL VENOUS BLD VENIPUNCTURE: CPT | Performed by: OBSTETRICS & GYNECOLOGY

## 2018-03-08 PROCEDURE — 87491 CHLMYD TRACH DNA AMP PROBE: CPT | Performed by: OBSTETRICS & GYNECOLOGY

## 2018-03-08 PROCEDURE — 86780 TREPONEMA PALLIDUM: CPT | Performed by: OBSTETRICS & GYNECOLOGY

## 2018-03-08 PROCEDURE — 86696 HERPES SIMPLEX TYPE 2 TEST: CPT | Performed by: OBSTETRICS & GYNECOLOGY

## 2018-03-08 NOTE — NURSING NOTE
"Chief Complaint   Patient presents with     Physical       Initial /60 (BP Location: Right arm, Patient Position: Chair, Cuff Size: Adult Regular)  Pulse 64  Ht 5' 1.02\" (1.55 m)  Wt 150 lb 4 oz (68.2 kg)  LMP 01/25/2018 (Exact Date)  BMI 28.37 kg/m2 Estimated body mass index is 28.37 kg/(m^2) as calculated from the following:    Height as of this encounter: 5' 1.02\" (1.55 m).    Weight as of this encounter: 150 lb 4 oz (68.2 kg).  BP completed using cuff size: regular    No obstetric history on file.    The following HM Due: pap smear      The following patient reported/Care Every where data was sent to:  P ABSTRACT QUALITY INITIATIVES [[id number]]       N/a    Shawna Morgan CMA  March 8, 2018           "

## 2018-03-08 NOTE — LETTER
March 16, 2018      Ab Christianson  [address]  [id number]  [facility] [place] [facility] [address]    Dear Ms.Matuscsak Christianson,      This letter is in regards to your recent cervical cancer screening (Pap smear). Your Pap smear result was reported as LSIL - low grade squamous intraepthelial lesion. This means that there were mildly abnormal cells found in the sample that we collected from your cervix, but no cancer cells were found. The vast majority of patients with this result do not have significant cervical abnormalities.     Therefore, current guidelines recommend that you return in ONE year to repeat your Pap smear.      If you have questions about these results contact [phone].    Sincerely,      Kristie Sarmiento MD/Lynette Nissen RN

## 2018-03-08 NOTE — ASSESSMENT & PLAN NOTE
Pregnancy versus constipation versus metabolic causes versus ovarian pathology  Pregnancy test is negative.  Labs as ordered to rule out metabolic causes like hypothyroidism   Follow results.  If the tests are inconclusive and if she persists to have symptoms I would consider repeating pregnancy test in the next week and also getting an ultrasound of the pelvis to rule out ovarian pathology.

## 2018-03-08 NOTE — MR AVS SNAPSHOT
After Visit Summary   3/8/2018    Ab Christianson    MRN: [id number]           Patient Information     Date Of Birth          1995        Visit Information        Provider Department      3/8/2018 3:00 PM Kristie Sarmiento MD [facility]        Today's Diagnoses     Well female exam with routine gynecological exam    -  1    Encounter for screening for cervical cancer         Screening for venereal disease        Irregular periods          Care Instructions      Preventive Health Recommendations  Female Ages 18 to 25     Yearly exam:     See your health care provider every year in order to  o Review health changes.   o Discuss preventive care.    o Review your medicines if your doctor has prescribed any.      You should be tested each year for STDs (sexually transmitted diseases).       After age 20, talk to your provider about how often you should have cholesterol testing.      Starting at age 21, get a Pap test every three years. If you have an abnormal result, your doctor may have you test more often.      If you are at risk for diabetes, you should have a diabetes test (fasting glucose).     Shots:     Get a flu shot each year.     Get a tetanus shot every 10 years.     Consider getting the shot (vaccine) that prevents cervical cancer (Gardasil).    Nutrition:     Eat at least 5 servings of fruits and vegetables each day.    Eat whole-grain bread, whole-wheat pasta and brown rice instead of white grains and rice.    Talk to your provider about Calcium and Vitamin D.     Lifestyle    Exercise at least 150 minutes a week each week (30 minutes a day, 5 days a week). This will help you control your weight and prevent disease.    Limit alcohol to one drink per day.    No smoking.     Wear sunscreen to prevent skin cancer.    See your dentist every six months for an exam and cleaning.          Follow-ups after your visit        Follow-up notes from your care team      "Return if symptoms worsen or fail to improve.      Who to contact     If you have questions or need follow up information about today's clinic visit or your schedule please contact [facility] directly at [phone].  Normal or non-critical lab and imaging results will be communicated to you by MyChart, letter or phone within 4 business days after the clinic has received the results. If you do not hear from us within 7 days, please contact the clinic through MyChart or phone. If you have a critical or abnormal lab result, we will notify you by phone as soon as possible.  Submit refill requests through [employer]) or call your pharmacy and they will forward the refill request to us. Please allow 3 business days for your refill to be completed.          Additional Information About Your Visit        Neon LabsharSpeedDate Information     [employer]) gives you secure access to your electronic health record. If you see a primary care provider, you can also send messages to your care team and make appointments. If you have questions, please call your primary care clinic.  If you do not have a primary care provider, please call [phone] and they will assist you.        Care EveryWhere ID     This is your Care EveryWhere ID. This could be used by other organizations to access your [place] medical records  [id number]        Your Vitals Were     Pulse Height Last Period BMI (Body Mass Index)          64 5' 1.02\" (1.55 m) 01/25/2018 (Exact Date) 28.37 kg/m2         Blood Pressure from Last 3 Encounters:   03/08/18 110/60   03/06/18 112/78   01/08/18 110/58    Weight from Last 3 Encounters:   03/08/18 150 lb 4 oz (68.2 kg)   03/06/18 151 lb (68.5 kg)   01/08/18 146 lb (66.2 kg)              We Performed the Following     Anti Treponema     Chlamydia trachomatis PCR     HCG quantitative pregnancy     Hepatitis B surface antigen     Hepatitis C antibody     Herpes Simplex Virus 1 and 2 IgG     HIV Antigen Antibody Combo     HSV " IgM antibody     Neisseria gonorrhoeae PCR     PAP imaged thin layer screen only - recommended age 21 - 24 years        Primary Care Provider Office Phone # Fax #    KRISTY Ghosh [facility] [phone]       [facility] [address]  [facility] [address]        Equal Access to Services     NELLY GRANGER : Hadii aad ku hadasho Soomaali, waaxda luqadaha, qaybta kaalmada adeegyada, kelly parker haybobby shrestha . So [facility] [phone].    ATENCIÓN: Si habla español, tiene a jama disposición servicios gratuitos de asistencia lingüística. Mei al [phone].    We comply with applicable federal civil rights laws and [place] laws. We do not discriminate on the basis of race, color, national origin, age, disability, sex, sexual orientation, or gender identity.            Thank you!     Thank you for choosing [facility]  for your care. Our goal is always to provide you with excellent care. Hearing back from our patients is one way we can continue to improve our services. Please take a few minutes to complete the written survey that you may receive in the mail after your visit with us. Thank you!             Your Updated Medication List - Protect others around you: Learn how to safely use, store and throw away your medicines at www.disposemymeds.org.      Notice  As of 3/8/2018  3:37 PM    You have not been prescribed any medications.

## 2018-03-09 LAB
HBV SURFACE AG SERPL QL IA: NONREACTIVE
HCV AB SERPL QL IA: NONREACTIVE
HIV 1+2 AB+HIV1 P24 AG SERPL QL IA: NONREACTIVE
HSV1 IGG SERPL QL IA: >8 AI (ref 0–0.8)
HSV2 IGG SERPL QL IA: <0.2 AI (ref 0–0.8)
T PALLIDUM IGG+IGM SER QL: NEGATIVE

## 2018-03-13 ENCOUNTER — MYC MEDICAL ADVICE (OUTPATIENT)
Dept: OBGYN | Facility: OTHER | Age: 23
End: 2018-03-13

## 2018-03-13 LAB
COPATH REPORT: ABNORMAL
HSV 1+2 IGM SER-IMP: 0.8 INDEX VALUE (ref 0–0.89)
PAP: ABNORMAL

## 2018-03-13 NOTE — TELEPHONE ENCOUNTER
Spoke with patient.  She has questioned that when she received her Chicken pox vaccine that she was informed that it may cause future HSV testing to come positive.  Informed patient that I have not heard of that but would request more information from the provider.  She also stated that her boyfriend has had HSV testing done and was negative.  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  Patient still did not understand and wanted provider to review and respond.    Berny Elam RN, BSN      Responded via INFOGRAPHIQS with the following websites:    https://www.cdc.gov/std/herpes/stdfact-herpes.htm  http://www.who.int/mediacentre/factsheets/fs400/en/

## 2018-03-16 ENCOUNTER — RESULT FOLLOW UP (OUTPATIENT)
Dept: FAMILY MEDICINE | Facility: OTHER | Age: 23
End: 2018-03-16

## 2018-03-16 ENCOUNTER — TELEPHONE (OUTPATIENT)
Dept: OBGYN | Facility: OTHER | Age: 23
End: 2018-03-16

## 2018-03-16 DIAGNOSIS — R87.612 PAPANICOLAOU SMEAR OF CERVIX WITH LOW GRADE SQUAMOUS INTRAEPITHELIAL LESION (LGSIL): ICD-10-CM

## 2018-03-16 NOTE — TELEPHONE ENCOUNTER
Reason for Call:  Other results    Detailed comments: patient received her lab results from 03/08/2018 but she would like to speak to someone she has some questions about them.    Phone Number Patient can be reached at:    Telephone Information:   Mobile [phone]       Best Time: anytime    Can we leave a detailed message on this number? YES    Call taken on 3/16/2018 at 2:25 PM by Zita Joseph

## 2018-03-16 NOTE — PROGRESS NOTES
3/8/18 LSIL, 23 yr old. Plan 1 yr Dx pap cytology.  2/25/19 My Chart pap reminder message sent ([facility]). Patient read on 2/26/19 ([facility])  3/19/19 Reminder call, left msg ([facility])  4/4/19 This pt is lost to follow-up for pap tracking. Result follow-up encounter has been sent to provider as an FYI.

## 2018-03-16 NOTE — LETTER
February 25, 2019      Ab Nisacayla Sammy  [address]  [id number]  [place] [address]    Dear MsJulisaBenja Sammy,      At [facility], your health and wellness is our primary concern. That is why we are following up on an abnormal pap from 3/8/18, which was reported as LSIL. Your provider had recommended that you have a Pap smear completed by 3/8/19. Our records do not show that this has been scheduled.    It is important to complete the follow up that your provider has suggested for you to ensure that there are no worsening changes which may, over time, develop into cancer.      Please contact our office at  [phone] to schedule an appointment for a Pap smear at your earliest convenience. If you have questions or concerns, please call the clinic and we will be happy to assist you.    If you have completed the tests outside of [facility], please have the results forwarded to our office. We will update the chart for your primary Physician to review before your next annual physical.     Thank you for choosing [facility]!    Sincerely,      Kristie Sarmiento MD/alex

## 2018-03-16 NOTE — TELEPHONE ENCOUNTER
I called and spoke to patient.  She had many questions regarding her HSV and LSIL.  All questions answered.    Ruthie Olivares

## 2018-04-02 ENCOUNTER — MYC MEDICAL ADVICE (OUTPATIENT)
Dept: OBGYN | Facility: OTHER | Age: 23
End: 2018-04-02

## 2018-04-02 DIAGNOSIS — N93.9 ABNORMAL UTERINE BLEEDING (AUB): Primary | ICD-10-CM

## 2018-04-02 NOTE — TELEPHONE ENCOUNTER
Ab Christianson is a 23 year old female who calls with US request.    NURSING ASSESSMENT:  Description:  Patient still has not received a period for 2 months and would like to an Ultrasound and would like to verify it checks her ovaries. Denies pain, discharge, itching, odors, fevers.   Onset/duration:  2 month   Precip. factors:  Abnormal periods  Associated symptoms:  Lack of perioids  Improves/worsens symptoms:  Same   Pain scale (0-10)   0/10  LMP/preg/breast feeding:  No LMP recorded. Patient is not currently having periods (Reason: Irregular Periods). 01/26/2018  Last exam/Treatment:  03/08/2018  Allergies:   Allergies   Allergen Reactions     Caffeine Palpitations     NURSING PLAN: Routed to provider Yes    RECOMMENDED DISPOSITION:  TBD  Will comply with recommendation: Yes  If further questions/concerns or if symptoms do not improve, worsen or new symptoms develop, call your PCP or [facility] as soon as possible.    NOTES:  Disposition was determined by the first positive assessment question, therefore all previous assessment questions were negative    Guideline used:  Telephone Triage Protocols for Nurses, Fifth Edition, Nora Vicente  Vaginal bleeding  Nursing Judgment    Alina Humphrey, RN, BSN

## 2018-04-02 NOTE — TELEPHONE ENCOUNTER
LM for the patient to return call to the clinic to discuss the below. Will await to hear from patient. Alina Humphrey RN, BSN     Responded via [employer]. Alina Humphrey RN, BSN

## 2018-04-02 NOTE — TELEPHONE ENCOUNTER
RN please triage.      I understand she had some irregular bleeding when I saw her for her physical.  Please find out why she is requesting the US and associated symptoms.  She may need to see me in the office.

## 2018-04-10 ENCOUNTER — RADIANT APPOINTMENT (OUTPATIENT)
Dept: ULTRASOUND IMAGING | Facility: OTHER | Age: 23
End: 2018-04-10
Attending: OBSTETRICS & GYNECOLOGY
Payer: COMMERCIAL

## 2018-04-10 DIAGNOSIS — N93.9 ABNORMAL UTERINE BLEEDING (AUB): ICD-10-CM

## 2018-04-10 PROCEDURE — 76856 US EXAM PELVIC COMPLETE: CPT

## 2018-04-10 PROCEDURE — 76830 TRANSVAGINAL US NON-OB: CPT

## 2018-05-11 ENCOUNTER — MYC MEDICAL ADVICE (OUTPATIENT)
Dept: OBGYN | Facility: OTHER | Age: 23
End: 2018-05-11

## 2018-05-14 NOTE — TELEPHONE ENCOUNTER
Pt called back, [facility] first opening was June 4th and pt did not want to wait that long. Is there anyway she can be worked in? Please advise.

## 2018-05-14 NOTE — TELEPHONE ENCOUNTER
Left message for patient to return call, she can be scheduled with Dr Sarmiento when call is returned.

## 2018-05-17 ENCOUNTER — OFFICE VISIT (OUTPATIENT)
Dept: OBGYN | Facility: OTHER | Age: 23
End: 2018-05-17
Payer: COMMERCIAL

## 2018-05-17 VITALS
WEIGHT: 147 LBS | BODY MASS INDEX: 27.75 KG/M2 | DIASTOLIC BLOOD PRESSURE: 62 MMHG | HEART RATE: 64 BPM | SYSTOLIC BLOOD PRESSURE: 110 MMHG

## 2018-05-17 DIAGNOSIS — Z30.09 CONTRACEPTIVE EDUCATION: Primary | ICD-10-CM

## 2018-05-17 PROCEDURE — 99213 OFFICE O/P EST LOW 20 MIN: CPT | Performed by: ADVANCED PRACTICE MIDWIFE

## 2018-05-17 NOTE — PROGRESS NOTES
Ab Christianson is a 23 year old who presents to the clinic for discussion of birth control methods.   She has used the following methods in the past: SOLE  Today she is interested in discussing Depo Provera and Nexplanon  Histories reviewed and updated  Past Medical History:   Diagnosis Date     Papanicolaou smear of cervix with low grade squamous intraepithelial lesion (LGSIL) 2018    3/8/18 LSIL, 23 yr old.     Past Surgical History:   Procedure Laterality Date     TONSILLECTOMY & ADENOIDECTOMY       Social History     Social History     Marital status: Single     Spouse name: N/A     Number of children: N/A     Years of education: N/A     Occupational History     Not on file.     Social History Main Topics     Smoking status: Never Smoker     Smokeless tobacco: Never Used     Alcohol use No     Drug use: No     Sexual activity: Yes     Partners: Male     Birth control/ protection: None     Other Topics Concern     Not on file     Social History Narrative     Family History   Problem Relation Age of Onset     DIABETES Maternal Grandmother      Breast Cancer Maternal Grandmother      Prostate Cancer Maternal Grandfather 67      at age 67     Asthma No family hx of        Menstrual History    Menses every 26-35 days.  Length of menses: 5 days  Menstrual description: normal and crampy lately.    She also hasn't had a period since        Denies the following contraindications to OCP use:    Smoking  Liver disease  Personal and family history of blood clot or stroke under the age of 50.  History of heart disease  History of breast cancer  History of lupus  History of hypertension       ROS: 10 point ROS neg other than the symptoms noted above in the HPI.    EXAM:  /62 (BP Location: Left arm, Patient Position: Chair, Cuff Size: Adult Regular)  Pulse 64  Wt 147 lb (66.7 kg)  LMP 2018 (Exact Date)  BMI 27.75 kg/m2          ASSESSMENT/PLAN:  There are no  contraindications to the use of Nexplanon    (Z30.09) Contraceptive education  (primary encounter diagnosis)  Comment:   Plan:   Discussed her options for birth control.   She is not a candidate for CC due to migraines  She is most interested in nexplanon but is worried that her insurance won't cover.  Prefers to schedule for another day and confirm that insurance will cover.    Reviewed ultrasound and the possibility of PCOS.  Will consider labs tomorrow.     Visit length 30   minutes was spent face to face with the patient today discussing her history, diagnosis, and follow-up plan as noted above.  Over 50% of the visit was spent in counseling and coordination of care.    Time noted does not include time required to complete procedures.

## 2018-05-17 NOTE — NURSING NOTE
"Chief Complaint   Patient presents with     Abnormal Uterine Bleeding     Discuss birth control to regulate periods       Initial /62 (BP Location: Left arm, Patient Position: Chair, Cuff Size: Adult Regular)  Pulse 64  Wt 147 lb (66.7 kg)  LMP 01/25/2018 (Exact Date)  BMI 27.75 kg/m2 Estimated body mass index is 27.75 kg/(m^2) as calculated from the following:    Height as of 3/8/18: 5' 1.02\" (1.55 m).    Weight as of this encounter: 147 lb (66.7 kg).  Medication Reconciliation: complete    Stefanie Gonzalez CMA    "

## 2018-05-17 NOTE — PATIENT INSTRUCTIONS
Polycystic Ovary Syndrome  Polycystic ovary syndrome (PCOS) causes harmless, small cysts in the ovaries and other symptoms. PCOS is caused by certain hormones being out of balance. The word  syndrome  means a group of symptoms. Women with PCOS may have no periods, irregular periods, or very long periods.    Your ovaries  Women store their eggs in their ovaries. Each egg is in a capsule called a follicle. Normally during the reproductive years, one follicle grows to produce a mature egg each month. This egg is released during ovulation and the follicle dissolves.  Hormones out of balance  With polycystic ovary syndrome (PCOS), the hormones that control ovulation are out of balance. These include estrogen, progesterone, and androgen. As a result, ovulation may not occur. Instead, the follicle stays enlarged. This is a fluid-filled sac called a cyst. Over time, the ovaries fill with many small cysts. This is why they are called  poly  or many  cystic  ovaries. In some women, the ovaries also make too much androgen.  PCOS symptoms  Women with PCOS may also have one or more of these symptoms:    Acne    Hair growth on the face and other parts of the body    Patches of thickened, velvety, darkened skin called acanthosis nigricans    Trouble getting pregnant (fertility problems)    Weight gain, especially around the waist   Women with PCOS are also at increased risk of developing cancer of the uterine lining, diabetes, and heart disease.   Date Last Reviewed: 6/1/2017 2000-2017 [employer]. [address] [place] [address], [place] [address]. All rights reserved. This information is not intended as a substitute for professional medical care. Always follow your healthcare professional's instructions.

## 2018-05-17 NOTE — MR AVS SNAPSHOT
After Visit Summary   5/17/2018    Ab Christianson    MRN: [id number]           Patient Information     Date Of Birth          1995        Visit Information        Provider Department      5/17/2018 5:30 PM Kyra Jj APRN [facility]        Today's Diagnoses     Nexplanon insertion    -  1    Nexplanon in place        Contraceptive education          Care Instructions      Polycystic Ovary Syndrome  Polycystic ovary syndrome (PCOS) causes harmless, small cysts in the ovaries and other symptoms. PCOS is caused by certain hormones being out of balance. The word  syndrome  means a group of symptoms. Women with PCOS may have no periods, irregular periods, or very long periods.    Your ovaries  Women store their eggs in their ovaries. Each egg is in a capsule called a follicle. Normally during the reproductive years, one follicle grows to produce a mature egg each month. This egg is released during ovulation and the follicle dissolves.  Hormones out of balance  With polycystic ovary syndrome (PCOS), the hormones that control ovulation are out of balance. These include estrogen, progesterone, and androgen. As a result, ovulation may not occur. Instead, the follicle stays enlarged. This is a fluid-filled sac called a cyst. Over time, the ovaries fill with many small cysts. This is why they are called  poly  or many  cystic  ovaries. In some women, the ovaries also make too much androgen.  PCOS symptoms  Women with PCOS may also have one or more of these symptoms:    Acne    Hair growth on the face and other parts of the body    Patches of thickened, velvety, darkened skin called acanthosis nigricans    Trouble getting pregnant (fertility problems)    Weight gain, especially around the waist   Women with PCOS are also at increased risk of developing cancer of the uterine lining, diabetes, and heart disease.   Date Last Reviewed: 6/1/2017 2000-2017 [employer]  [employer]. [address] [place] [address]. All rights reserved. This information is not intended as a substitute for professional medical care. Always follow your healthcare professional's instructions.                Follow-ups after your visit        Follow-up notes from your care team     Return if symptoms worsen or fail to improve.      Who to contact     If you have questions or need follow up information about today's clinic visit or your schedule please contact [facility] directly at [phone].  Normal or non-critical lab and imaging results will be communicated to you by SoWeTriphart, letter or phone within 4 business days after the clinic has received the results. If you do not hear from us within 7 days, please contact the clinic through SoWeTriphart or phone. If you have a critical or abnormal lab result, we will notify you by phone as soon as possible.  Submit refill requests through Rain or call your pharmacy and they will forward the refill request to us. Please allow 3 business days for your refill to be completed.          Additional Information About Your Visit        SoWeTriphart Information     Rain gives you secure access to your electronic health record. If you see a primary care provider, you can also send messages to your care team and make appointments. If you have questions, please call your primary care clinic.  If you do not have a primary care provider, please call [phone] and they will assist you.        Care EveryWhere ID     This is your Care EveryWhere ID. This could be used by other organizations to access your [facility] medical records  [id number]        Your Vitals Were     Pulse Last Period BMI (Body Mass Index)             64 01/25/2018 (Exact Date) 27.75 kg/m2          Blood Pressure from Last 3 Encounters:   05/17/18 110/62   03/08/18 110/60   03/06/18 112/78    Weight from Last 3 Encounters:   05/17/18 147 lb (66.7 kg)   03/08/18 150 lb 4 oz  (68.2 kg)   03/06/18 151 lb (68.5 kg)              Today, you had the following     No orders found for display       Primary Care Provider Office Phone # Fax #    KRISTY Ghosh [phone]       [address]  [place] [address]        Equal Access to Services     [facility]: Hadii aad ku hadasho Soomaali, waaxda luqadaha, qaybta kaalmada adeegyada, waxay idiin hayaan adeeg kharash lajessi . So [facility] [phone].    ATENCIÓN: Si habla español, tiene a jama disposición servicios gratuitos de asistencia lingüística. Mei al [phone].    We comply with applicable federal civil rights laws and [place] laws. We do not discriminate on the basis of race, color, national origin, age, disability, sex, sexual orientation, or gender identity.            Thank you!     Thank you for choosing [facility]  for your care. Our goal is always to provide you with excellent care. Hearing back from our patients is one way we can continue to improve our services. Please take a few minutes to complete the written survey that you may receive in the mail after your visit with us. Thank you!             Your Updated Medication List - Protect others around you: Learn how to safely use, store and throw away your medicines at www.disposemymeds.org.      Notice  As of 5/17/2018  5:50 PM    You have not been prescribed any medications.

## 2018-05-18 ENCOUNTER — OFFICE VISIT (OUTPATIENT)
Dept: OBGYN | Facility: OTHER | Age: 23
End: 2018-05-18
Payer: COMMERCIAL

## 2018-05-18 VITALS
DIASTOLIC BLOOD PRESSURE: 58 MMHG | HEART RATE: 64 BPM | BODY MASS INDEX: 28.04 KG/M2 | SYSTOLIC BLOOD PRESSURE: 98 MMHG | WEIGHT: 148.5 LBS

## 2018-05-18 DIAGNOSIS — Z30.017 NEXPLANON INSERTION: Primary | ICD-10-CM

## 2018-05-18 DIAGNOSIS — Z97.5 NEXPLANON IN PLACE: ICD-10-CM

## 2018-05-18 DIAGNOSIS — N91.2 AMENORRHEA: ICD-10-CM

## 2018-05-18 LAB
FSH SERPL-ACNC: 5.2 IU/L
PROLACTIN SERPL-MCNC: 12 UG/L (ref 3–27)

## 2018-05-18 PROCEDURE — 83001 ASSAY OF GONADOTROPIN (FSH): CPT | Performed by: ADVANCED PRACTICE MIDWIFE

## 2018-05-18 PROCEDURE — 36415 COLL VENOUS BLD VENIPUNCTURE: CPT | Performed by: ADVANCED PRACTICE MIDWIFE

## 2018-05-18 PROCEDURE — 84270 ASSAY OF SEX HORMONE GLOBUL: CPT | Performed by: ADVANCED PRACTICE MIDWIFE

## 2018-05-18 PROCEDURE — 11981 INSERTION DRUG DLVR IMPLANT: CPT | Performed by: ADVANCED PRACTICE MIDWIFE

## 2018-05-18 PROCEDURE — 84146 ASSAY OF PROLACTIN: CPT | Performed by: ADVANCED PRACTICE MIDWIFE

## 2018-05-18 PROCEDURE — 84403 ASSAY OF TOTAL TESTOSTERONE: CPT | Performed by: ADVANCED PRACTICE MIDWIFE

## 2018-05-18 PROCEDURE — 82627 DEHYDROEPIANDROSTERONE: CPT | Performed by: ADVANCED PRACTICE MIDWIFE

## 2018-05-18 NOTE — PROGRESS NOTES
NEXPLANON INSERTION PROCEDURE    Ab Christianson is a 23 year old No obstetric history on file. who presents for Nexplanon insertion. No LMP recorded. Patient is not currently having periods (Reason: Irregular Periods).  The patient is currently using abstinence  for contraception.     Tests:     Discussed risks of bleeding and infection with placement and the insertion procedure. Also discussed the possibility of irregular bleeding for 3-6 months and then often cessation of menses but possibility of continued abnormal bleeding. Small risk of migration of the Nexplanon or difficulty removing the Nexplanon. We also discussed possible side effects of weight gain, skin or hair changes, alterations in mood and increase in headaches.  Lasts for 3 years at which time she could have this one removed and another replaced. All questions answered and consent form signed.   Preprocedure medications: 1% plain lidocaine, 1-2 ml  Nexplanon Lot # [id number]  [id number]  [id number]      Exp date:1/20   NDC 8533-6720-51    All equipment required was ready and available.  Patients allergies were confirmed.  The patient was placed in the supine position with her left (non-dominant) arm flexed at the elbow, externally rotated, and placed with her wrist parallel to her ear.  The insertion site was identified 6-8 cm above the elbow crease at the inner aspect overlying the bicepital groove.  The insertion site was marked with a sterile marker. The direction of insertion was also indicated with a juliette 6-8 cm proximal in the bicepital groove.  The insertion area was cleaned with betadine swabs and anesthetized with 3 cc of 1% lidocaine without epinephrine.  The Nexplanon was removed from its blister.  The needle shield was removed.  Counter-traction was applied to the skin at the marked needle insertion site.  The tip of the needle was inserted at the site, beveled side up, at a slight angle.  The applicator was then lowered to  a horizontal position.  The needle was inserted to its full length, keeping the needle parallel to the surface of the skin and the skin tented.   The cannula was retracted against the obturator.  The 4 cm mar was palpated under the skin.  The patient also palpated the mar.  A pressure bandage was applied with sterile gauze. The patient was instructed to remove the bangage in several hours and replace with a band-aid.    The user card was filled out and given to the patient to keep.    PLAN:   The patient was asked to contact the clinic for any fever/chills/severe pelvic or abdominal pain or heavy bleeding.     FOLLOW-UP:  She was asked to follow up for any problems.

## 2018-05-18 NOTE — MR AVS SNAPSHOT
After Visit Summary   5/18/2018    Ab Christianson    MRN: [id number]           Patient Information     Date Of Birth          1995        Visit Information        Provider Department      5/18/2018 11:00 AM Kyra Jj APRN [facility]        Today's Diagnoses     Nexplanon insertion    -  1    Nexplanon in place        Amenorrhea          Care Instructions    Leave the pressure dressing in place for 4-6 hours.  If you feel the dressing is too tight loosen it or remove it completely.  Leave the Band-Aid in place for 24 hours.  Change it if wet.   Leave the steri strip in place until it falls off by itself.  Call the clinic with fever, chills or bleeding from the site.   Use a back up method of birth control such as condoms or abstain from intercourse for 7 days.   Call the clinic for bleeding that is heavier than a normal period for you or if you experience severe pelvic pain.      What Nexplanon Users May Expect    For appropiate patients, Nexplanon is well tolerated and has a low early-removal rate.    Insertion site complications, such as prolonged pain or infection, are rare. Removal is occasionally difficult, and rarely requires a surgical procedure in the operating room.    Menstrual changes are common with Nexplanon. Bleeding may become more or less frequent or heavy, or absent. The bleeding pattern after the first three months is predictive of future bleeding, but the pattern may change at any time. Average bleeding is 18 days over 3 months. Over 50% of women experience rare over absent bleeding over the two year period, while 25% experience frequent or prolonged bleeding.    In clinical studies, users gained 3.7 pounds over two years. It is unknown what portion of this weight gain is related to Nexplanon    Women with a history of depressed mood may have worsening on Nexplanon, and may need to have the device removed.    Return to baseline  ovulation patterns is seen 7-14 days after removal of Nexplanon.    Rarely, headaches and acne have also let to device removal.    Nexplanon may be less effective in women weight more than 130% of their ideal body weight.    Nexplanon does not protect against HIV or STDs.    Please call [facility] at [phone] if you have questions or concerns.    For more complete information:  [email or web address]              Follow-ups after your visit        Your next 10 appointments already scheduled     May 18, 2018 11:00 AM CDT   [facility]    [address] [facility] [address] [phone]           Bring a current list of meds and any records pertaining to this visit. For Physicals, please bring immunization records and any forms needing to be filled out. Please arrive 10 minutes early to complete paperwork.              Who to contact     If you have questions or need follow up information about today's clinic visit or your schedule please contact [facility] directly at [phone].  Normal or non-critical lab and imaging results will be communicated to you by MyChart, letter or phone within 4 business days after the clinic has received the results. If you do not hear from us within 7 days, please contact the clinic through VoIP Logichart or phone. If you have a critical or abnormal lab result, we will notify you by phone as soon as possible.  Submit refill requests through cloudControl or call your pharmacy and they will forward the refill request to us. Please allow 3 business days for your refill to be completed.          Additional Information About Your Visit        VoIP Logichart Information     cloudControl gives you secure access to your electronic health record. If you see a primary care provider, you can also send messages to your care team and make  appointments. If you have questions, please call your primary care clinic.  If you do not have a primary care provider, please call [phone] and they will assist you.        Care EveryWhere ID     This is your Care EveryWhere ID. This could be used by other organizations to access your [facility] medical records  [id number]        Your Vitals Were     Pulse BMI (Body Mass Index)                64 28.04 kg/m2           Blood Pressure from Last 3 Encounters:   05/18/18 98/58   05/17/18 110/62   03/08/18 110/60    Weight from Last 3 Encounters:   05/18/18 148 lb 8 oz (67.4 kg)   05/17/18 147 lb (66.7 kg)   03/08/18 150 lb 4 oz (68.2 kg)              We Performed the Following     DHEA sulfate     ETONOGESTREL IMPLANT SYSTEM     Follicle stimulating hormone     INSERTION NON-BIODEGRADABLE DRUG DELIVERY IMPLANT     Prolactin     Testosterone Free and Total          Today's Medication Changes          These changes are accurate as of 5/18/18 10:27 AM.  If you have any questions, ask your nurse or doctor.               Start taking these medicines.        Dose/Directions    etonogestrel 68 MG Impl   Commonly known as:  IMPLANON/NEXPLANON   Used for:  Nexplanon insertion, Nexplanon in place   Started by:  Kyra Jj APRN CNM        Dose:  1 each   1 each (68 mg) by Subdermal route continuous   Refills:  0            Where to get your medicines      Some of these will need a paper prescription and others can be bought over the counter.  Ask your nurse if you have questions.     You don't need a prescription for these medications     etonogestrel 68 MG Impl                Primary Care Provider Office Phone # Fax #    KRISTY Ghosh [facility] [phone]       [facility] [address] [facility] [address]  [facility] [address]        Equal Access to Services     NELLY GRANGER AH: Jan Crawford, deana fink, amalia burton, kelly adorno. So  [facility] [phone].    ATENCIÓN: Si habla kenny, tiene a jama disposición servicios gratuitos de asistencia lingüística. Mei al [phone].    We comply with applicable federal civil rights laws and [place] laws. We do not discriminate on the basis of race, color, national origin, age, disability, sex, sexual orientation, or gender identity.            Thank you!     Thank you for choosing [facility]  for your care. Our goal is always to provide you with excellent care. Hearing back from our patients is one way we can continue to improve our services. Please take a few minutes to complete the written survey that you may receive in the mail after your visit with us. Thank you!             Your Updated Medication List - Protect others around you: Learn how to safely use, store and throw away your medicines at www.disposemymeds.org.          This list is accurate as of 5/18/18 10:27 AM.  Always use your most recent med list.                   Brand Name Dispense Instructions for use Diagnosis    etonogestrel 68 MG Impl    IMPLANON/NEXPLANON     1 each (68 mg) by Subdermal route continuous    Nexplanon insertion, Nexplanon in place

## 2018-05-18 NOTE — NURSING NOTE
"Chief Complaint   Patient presents with     Contraception     Nexplanon       Initial BP 98/58 (BP Location: Right arm, Patient Position: Chair, Cuff Size: Adult Regular)  Pulse 64  Wt 148 lb 8 oz (67.4 kg)  BMI 28.04 kg/m2 Estimated body mass index is 28.04 kg/(m^2) as calculated from the following:    Height as of 3/8/18: 5' 1.02\" (1.55 m).    Weight as of this encounter: 148 lb 8 oz (67.4 kg).  Medication Reconciliation: complete    Stefanie Gonzalez CMA    "

## 2018-05-21 LAB — DHEA-S SERPL-MCNC: 243 UG/DL (ref 35–430)

## 2018-05-23 ENCOUNTER — TELEPHONE (OUTPATIENT)
Dept: OBGYN | Facility: CLINIC | Age: 23
End: 2018-05-23

## 2018-05-23 PROBLEM — E28.2 PCOS (POLYCYSTIC OVARIAN SYNDROME): Status: ACTIVE | Noted: 2018-05-23

## 2018-05-23 LAB
SHBG SERPL-SCNC: 13 NMOL/L (ref 30–135)
TESTOST FREE SERPL-MCNC: 1.2 NG/DL (ref 0.08–0.74)
TESTOST SERPL-MCNC: 43 NG/DL (ref 8–60)

## 2018-05-23 NOTE — TELEPHONE ENCOUNTER
Returned call.   Prefers to make a follow up appointment to discuss new diagnosis of PCOS.   KRISTY Rice, CNM

## 2018-05-29 ENCOUNTER — OFFICE VISIT (OUTPATIENT)
Dept: OBGYN | Facility: OTHER | Age: 23
End: 2018-05-29
Payer: COMMERCIAL

## 2018-05-29 VITALS
DIASTOLIC BLOOD PRESSURE: 68 MMHG | HEART RATE: 68 BPM | WEIGHT: 148.5 LBS | SYSTOLIC BLOOD PRESSURE: 100 MMHG | BODY MASS INDEX: 28.04 KG/M2

## 2018-05-29 DIAGNOSIS — E28.2 PCOS (POLYCYSTIC OVARIAN SYNDROME): Primary | ICD-10-CM

## 2018-05-29 PROCEDURE — 99213 OFFICE O/P EST LOW 20 MIN: CPT | Performed by: ADVANCED PRACTICE MIDWIFE

## 2018-05-29 NOTE — PROGRESS NOTES
Ab Christianson is a 23 year old who presents to the clinic for discussion of lab work for diagnosis of PCOS.      Histories reviewed and updated  Past Medical History:   Diagnosis Date     Papanicolaou smear of cervix with low grade squamous intraepithelial lesion (LGSIL) 2018    3/8/18 LSIL, 23 yr old.     Past Surgical History:   Procedure Laterality Date     TONSILLECTOMY & ADENOIDECTOMY       Social History     Social History     Marital status: Single     Spouse name: N/A     Number of children: N/A     Years of education: N/A     Occupational History     Not on file.     Social History Main Topics     Smoking status: Never Smoker     Smokeless tobacco: Never Used     Alcohol use No     Drug use: No     Sexual activity: Yes     Partners: Male     Birth control/ protection: Implant     Other Topics Concern     Not on file     Social History Narrative     Family History   Problem Relation Age of Onset     DIABETES Maternal Grandmother      Breast Cancer Maternal Grandmother      Prostate Cancer Maternal Grandfather 67      at age 67     Asthma No family hx of             ROS: 10 point ROS neg other than the symptoms noted above in the HPI.      EXAM:  /68 (BP Location: Right arm, Patient Position: Chair, Cuff Size: Adult Regular)  Pulse 68  Wt 148 lb 8 oz (67.4 kg)  BMI 28.04 kg/m2        ASSESSMENT/PLAN:    (E28.2) PCOS (polycystic ovarian syndrome)  (primary encounter diagnosis)  Comment:   Plan:   Discussed her lab and ultrasound results and why they indicate PCOS.   Reviewed what PCOS is and how to manage it.   Reviewed the importance of healthy weight and the impact of obesity on ability to achieve pregnancy    She doesn't want to be pregnant in the next two years, so recommended continuing nexplanon which she many not have a period with.  Remove nexplanon when she is ready to get pregnant and if not pregnant or no menses 6 months after removal would recommend follow up  with OB/GYN for ovulation stimulation.   She expressed understanding, had no further questions.     Visit length 20 minutes was spent face to face with the patient today discussing her history, diagnosis, and follow-up plan as noted above.  Over 50% of the visit was spent in counseling and coordination of care.    Time noted does not include time required to complete procedures.

## 2018-05-29 NOTE — NURSING NOTE
"Chief Complaint   Patient presents with     Consult     PCOS consult       Initial /68 (BP Location: Right arm, Patient Position: Chair, Cuff Size: Adult Regular)  Pulse 68  Wt 148 lb 8 oz (67.4 kg)  BMI 28.04 kg/m2 Estimated body mass index is 28.04 kg/(m^2) as calculated from the following:    Height as of 3/8/18: 5' 1.02\" (1.55 m).    Weight as of this encounter: 148 lb 8 oz (67.4 kg).  Medication Reconciliation: complete    Stefanie Gonzalez CMA    "

## 2018-05-29 NOTE — MR AVS SNAPSHOT
After Visit Summary   5/29/2018    Ab Christianson    MRN: [id number]           Patient Information     Date Of Birth          1995        Visit Information        Provider Department      5/29/2018 1:00 PM Kyra Jj APRN CNM [facility]        Today's Diagnoses     PCOS (polycystic ovarian syndrome)    -  1       Follow-ups after your visit        Follow-up notes from your care team     Return in about 1 year (around 5/29/2019), or if symptoms worsen or fail to improve.      Who to contact     If you have questions or need follow up information about today's clinic visit or your schedule please contact [facility] directly at [phone].  Normal or non-critical lab and imaging results will be communicated to you by [employer]inhart, letter or phone within 4 business days after the clinic has received the results. If you do not hear from us within 7 days, please contact the clinic through Hoodinhart or phone. If you have a critical or abnormal lab result, we will notify you by phone as soon as possible.  Submit refill requests through [employer] or call your pharmacy and they will forward the refill request to us. Please allow 3 business days for your refill to be completed.          Additional Information About Your Visit        MyChart Information     [employer] gives you secure access to your electronic health record. If you see a primary care provider, you can also send messages to your care team and make appointments. If you have questions, please call your primary care clinic.  If you do not have a primary care provider, please call [phone] and they will assist you.        Care EveryWhere ID     This is your Care EveryWhere ID. This could be used by other organizations to access your [place] medical records  [id number]        Your Vitals Were     Pulse BMI (Body Mass Index)                68 28.04 kg/m2           Blood Pressure from Last 3  Encounters:   05/29/18 100/68   05/18/18 98/58   05/17/18 110/62    Weight from Last 3 Encounters:   05/29/18 148 lb 8 oz (67.4 kg)   05/18/18 148 lb 8 oz (67.4 kg)   05/17/18 147 lb (66.7 kg)              Today, you had the following     No orders found for display       Primary Care Provider Office Phone # Fax #    KRISTY Ghosh [facility] [phone]       [facility] [address]  [facility] [address]        Equal Access to Services     NELLY GRANGER : Hadii aad ku hadasho Soomaali, waaxda luqadaha, qaybta kaalmada adeegyada, kelly shrestha . So [facility] [phone].    ATENCIÓN: Si habla español, tiene a jama disposición servicios gratuitos de asistencia lingüística. [facility] [phone].    We comply with applicable federal civil rights laws and [place] laws. We do not discriminate on the basis of race, color, national origin, age, disability, sex, sexual orientation, or gender identity.            Thank you!     Thank you for choosing [facility]  for your care. Our goal is always to provide you with excellent care. Hearing back from our patients is one way we can continue to improve our services. Please take a few minutes to complete the written survey that you may receive in the mail after your visit with us. Thank you!             Your Updated Medication List - Protect others around you: Learn how to safely use, store and throw away your medicines at www.disposemymeds.org.          This list is accurate as of 5/29/18  1:22 PM.  Always use your most recent med list.                   Brand Name Dispense Instructions for use Diagnosis    etonogestrel 68 MG Impl    IMPLANON/NEXPLANON     1 each (68 mg) by Subdermal route continuous    Nexplanon insertion, Nexplanon in place

## 2018-06-03 ENCOUNTER — MYC MEDICAL ADVICE (OUTPATIENT)
Dept: OBGYN | Facility: OTHER | Age: 23
End: 2018-06-03

## 2018-06-15 ENCOUNTER — MYC MEDICAL ADVICE (OUTPATIENT)
Dept: OBGYN | Facility: OTHER | Age: 23
End: 2018-06-15

## 2018-10-15 ENCOUNTER — MYC MEDICAL ADVICE (OUTPATIENT)
Dept: OBGYN | Facility: OTHER | Age: 23
End: 2018-10-15

## 2018-10-30 ENCOUNTER — MYC MEDICAL ADVICE (OUTPATIENT)
Dept: OBGYN | Facility: OTHER | Age: 23
End: 2018-10-30

## 2019-03-19 ENCOUNTER — TELEPHONE (OUTPATIENT)
Dept: FAMILY MEDICINE | Facility: OTHER | Age: 24
End: 2019-03-19

## 2019-03-19 NOTE — TELEPHONE ENCOUNTER
Pt is past due for physical, f/u pap smear/HPV test.  1 reminder letter sent previously.  Left msg today for patient to call clinic to schedule.    If no reply and/or appt within two weeks (4/2/19) patient will be considered lost to pap tracking f/u.    ALLISON YeagerN, RN, Pap Tracking Nurse

## 2020-03-02 ENCOUNTER — HEALTH MAINTENANCE LETTER (OUTPATIENT)
Age: 25
End: 2020-03-02

## 2020-12-14 ENCOUNTER — HEALTH MAINTENANCE LETTER (OUTPATIENT)
Age: 25
End: 2020-12-14

## 2021-04-18 ENCOUNTER — HEALTH MAINTENANCE LETTER (OUTPATIENT)
Age: 26
End: 2021-04-18

## 2021-10-02 ENCOUNTER — HEALTH MAINTENANCE LETTER (OUTPATIENT)
Age: 26
End: 2021-10-02

## 2022-05-14 ENCOUNTER — HEALTH MAINTENANCE LETTER (OUTPATIENT)
Age: 27
End: 2022-05-14

## 2022-09-03 ENCOUNTER — HEALTH MAINTENANCE LETTER (OUTPATIENT)
Age: 27
End: 2022-09-03

## 2023-06-03 ENCOUNTER — HEALTH MAINTENANCE LETTER (OUTPATIENT)
Age: 28
End: 2023-06-03